# Patient Record
(demographics unavailable — no encounter records)

---

## 2024-10-10 NOTE — HISTORY OF PRESENT ILLNESS
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 PNDS poor sleep Rehem TMJ -Dr Nicole Pred-at present off f/u pos  cough  Noted in boot L Lower  ext  foot fx  ENT CT sinus possible surgical intervention  Recurrent repeat COVID-19 infection confirmed on 11/24/2022 Patient completed a course of oral Paxlovid Patient treated early December with Augmentin for a recurrent sinus infection F/U sleep  study Sleep Study Nov 17 /19 2022 AHI 4 *8 + hours of study does not meet criteria for OS  prefers Tudorza but issue with coverage Treatment first week of December for sinus congestion headache with Flonase antibiotic resolution of symptoms- mild chronic  Noted with Rheum elevated WBC Post hospitalization discharge from [facility] March  reports July 2020 COVID AB negative feels better with LAMA Turdoza Patient was having a week of some chest discomfort with shortness of breath and chest tightness No purulent sputum or hemoptysis reported Had some associated sinus symptoms with congestion and nasal drip No fever reported She was subsequently admitted put in isolation for possible presumed TB based on her chest x-ray and CT chest Findings were consistent with a prior known left upper lobe mass consistent with a well-known longstanding aspergilloma's, 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 3-day taper In addition she has no body aches or pains weight is stable appetite is normal no other known constitutional symptoms She has no loss of taste or smell

## 2024-10-10 NOTE — PROCEDURE
[FreeTextEntry1] : PFT 10/10/24 Mild  OAD  Lung Volumes WNL DLCO 84 % HGB 11.6  GITA 7/25/24 Mild OAD  improvement at FVC  POCT 7/12/2024 Rapid COVID-positive  Chest x-ray PA lateral April 25, 2024 Lung fields are consistent with the known 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  lung volumes nl range  DLCO 71 % WNL  HGB 13.2 stable pulmonary physiology  GITA 6/26/23  Very Mild OAD  No BD at FEV1  no decline at flows  PFT 3/20/23 Mild OAD Lung Volumes nl  No   airtrapping  DLCo 88 % HGB 11.1  Chest x-ray PA lateral January 30, 2023 Normal cardiac size Chronic left upper lobe density consistent with known fungal mycetoma No parenchymal infiltrates pleural effusions or dominant pulmonary nodules Lamination right hemidiaphragm impression chronic noted interval change of the left upper lobe mycetoma secondary to sarcoidosis  PFT 1/30/23 Flow rates nl Mild OAD Lung Volumes nl' DLCO 73 % low nlk range HGB 11.5 Mild OAD with mild decline at DLCO and TLC  Sleep Study Nov 17 /19 2022 AHI 4 *8 + hours of study does not meet criteria for JAVIER  PFT 11/17/22  Mild OAD Lung Volumes nl  DLCO 89 % pred WNL HGB 11.5 stable pulmonary physiology  Spirometry no bronchodilator September 29, 2022 Flow rates normal range Stable FEV1 Sawtooth pattern clinical correlation rule out obstructive sleep apnea  PFT  6/23/22  Mild OAD  Lung Volumes nl range ERV dec sec pt weight DLCO 82 % WNL HGB 12.3 stable pulmonary physiology  PFT 3/10/22 mild OAD lung volumes DLCO 80 n% HGB 11.0  X-ray PA lateral March 10, 2022 Cardiac size normal Lobulated density left upper lung zone Right lungs are clear Impression longstanding mycetoma from underlying sarcoidosis no interval change compared to prior x-rays continue to monitor  PFT 12/2/21 Flow rates nl  Mild OAD Lung Volumes nl DLCO 85 %  HGB 11.3 very minimal decline at pulmonary physiology  PFT 9/2/21 Mild OAD  Lung Volumes nl  DLCO 86 % pred nl range  HGB 11.3 stable to interval improvement pulmonary physiology  PFT 6/3/21 Nl flow  rates  mild OASD  Lung Volumes nl  DLCO 87 %  HGB 10.5  Chest x-ray PA lateral on January 4, 2021 indication leukocytosis Chronic mycetoma left upper lung zone No change Otherwise lungs are clear without parenchymal infiltrates pleural effusions or dominant pulmonary nodules Lamination right hemidiaphragm Impression pulmonary lesion consistent with known mycetoma left upper lung zone in a patient with sarcoidosis  PFT October 8, 2020 protocol Mild obstructive ventilatory impairment No significant response to bronchodilator at the FEV1 Lung volumes are normal Resistance and specific conductance are normal Diffusion normal 80% of predicted. Stable pulmonary physiology Consistent with known diagnosis of asthma  11/26/2019 Pulmonary function testing FEV1, FVC, and FEV1/FVC are within normal limits.   Flu vaccination IM 10/10/24 PCV 23 IM October 5, 2020

## 2024-10-10 NOTE — DISCUSSION/SUMMARY
[FreeTextEntry1] :  positive active COVID-19 infection-treatment Paxlovid July 12 2024  Posttreatment sinusitis with Augmentin with chronic sxs and cough-Not  active Post COVID-19 infection 11/24/2022 Moderate persistent asthma Sarcoidosis Left upper lobe chronic mycetoma/pulmonary  etiology sarcoidosis Rheumatoid arthritis history of arthritis on longstanding methotrexate Intermittent chronic vertigo Obstructive pattern spirometry high clinical suspicion rule out obstructive sleep apnea-formal sleep study ordered home Negative Medications reviewed Vaccination profile up to date BREO Ellipta  Tudorza 1 puff BID SUPPLIED sample TRELEGY  ENT recommendation Dr Little or  partners- nasal sprays for PND f/u PFT Q 3 months  Received MODERNA  completed and recommended booster 3 moths from prior COVID Nov 2022 infection valium flight phobia I stop check COVID variant booster  completed issue Biologic agent-investigate authorization for NUCALA Indications moderate to severe persistent asthma symptomatology recurrent use of antibiotics systemic corticosteroids long-term controller therapy wheezing elevated eosinophils Rx updated

## 2024-10-10 NOTE — PHYSICAL EXAM
[Normal Appearance] : normal appearance [Supple] : supple [No Neck Mass] : no neck mass [No JVD] : no jvd [Normal Color/ Pigmentation] : normal color/ pigmentation [No Focal Deficits] : no focal deficits [No Sensory Deficits] : no sensory deficits [General Appearance - Well Developed] : well developed [General Appearance - Well Nourished] : well nourished [Heart Rate And Rhythm] : heart rate and rhythm were normal [Heart Sounds] : normal S1 and S2 [Respiration, Rhythm And Depth] : normal respiratory rhythm and effort [Auscultation Breath Sounds / Voice Sounds] : lungs were clear to auscultation bilaterally [Abdomen Soft] : soft [Abdomen Tenderness] : non-tender [] : no hepato-splenomegaly [Nail Clubbing] : no clubbing of the fingernails [Cyanosis, Localized] : no localized cyanosis

## 2024-12-06 NOTE — PLAN
[FreeTextEntry1] : 63 year old female presents for routine gyn exam - PMH atrophy, osteopenia, LCIS, MVP, asthma, sarcoidosis, reflux, gastritis. SHx: hysteroscopic polyp removal. BSE taught Breast and pelvic exam performed Pap/HPV conducted 10/24 mammogram and breast sonogram. Rx given, due in 10/25. 2021 colonoscopy, due in 2026  Atrophy: Apply Balmex/Aquaphor BID Advised Revaree Plus 2-3x/wk D/w pt alternative methods and hormonal txs, such as topical estrogen products. All R/B/A reviewed. Pt to trial Revaree and OTC products at this time.  Osteopenia: 10/23 DEXA bone density. Due 10/25. D/w pt Calcium 500 mg and Vitamin D 1000U intake, as well as weight-bearing exercise, such as walking, for 30 min daily  H/o EM polyp: Advised to schedule pelvic sono 12/24  RTO in 1 year or PRN

## 2024-12-06 NOTE — HISTORY OF PRESENT ILLNESS
[FreeTextEntry1] : 2024. MATTHEW REYNA 63 year old female  LMP PM presents for annual visit. PMH atrophy, osteopenia, LCIS, MVP, asthma, sarcoidosis, reflux, gastritis. SHx: hysteroscopic polyp removal.   Pt feels well and offers no complaints. She denies vaginal bleeding, abnormal vaginal discharge or vaginitis sxs. No urinary complaints. BM is normal per patient, no bloody stool. She denies abdominal and pelvic pain.   She had endoscopy this summer due to GI issues, likely attributable to medications she takes for rheumatoid arthritis. Endoscopy was unremarkable per pt. She will f/u with rheum to discuss alternative medications.  PMH: atrophy, osteopenia, LCIS, MVP, asthma, sarcoidosis, HLD, HTN, rheumatoid arthritis, reflux, gastritis GYNHx: h/o polyp SHx: sinus sx, hysteroscopic polyp removal FHx: Denies FHx of breast, ovarian, uterine or colon cancer. Med: methotrexate, edarbyclor, rosuvastatin Has 27 y/o daughter works for [employer] in [place] PHQ9=2 [Mammogramdate] : 10/24 [TextBox_19] : BIRADS 2 [BreastSonogramDate] : 10/24 [TextBox_25] : BIRADS 2 [PapSmeardate] : 11/23 [TextBox_31] : NILM HPV negative [BoneDensityDate] : 11/23 [TextBox_37] : osteopenia, normal frax [ColonoscopyDate] : 2021 [TextBox_43] : due 2026

## 2024-12-06 NOTE — PHYSICAL EXAM
[Chaperone Present] : A chaperone was present in the examining room during all aspects of the physical examination [10054] : A chaperone was present during the pelvic exam. [Appropriately responsive] : appropriately responsive [Alert] : alert [No Acute Distress] : no acute distress [No Lymphadenopathy] : no lymphadenopathy [Regular Rate Rhythm] : regular rate rhythm [No Murmurs] : no murmurs [Clear to Auscultation B/L] : clear to auscultation bilaterally [Soft] : soft [Non-tender] : non-tender [Non-distended] : non-distended [No HSM] : No HSM [No Lesions] : no lesions [No Mass] : no mass [Oriented x3] : oriented x3 [Examination Of The Breasts] : a normal appearance [No Masses] : no breast masses were palpable [Vulvar Atrophy] : vulvar atrophy [Labia Majora] : normal [Labia Minora] : normal [Atrophy] : atrophy [Normal] : normal [Uterine Adnexae] : normal [FreeTextEntry2] : [facility]) [FreeTextEntry9] : no masses, guaiac negative

## 2025-01-10 NOTE — DISCUSSION/SUMMARY
[FreeTextEntry1] :  positive active COVID-19 infection-treatment Paxlovid July 12 2024  Posttreatment sinusitis with Augmentin with chronic sxs and cough-Not  active Post COVID-19 infection 11/24/2022 Moderate persistent asthma cont TRELEY Sarcoidosis Left upper lobe chronic mycetoma/pulmonary  etiology sarcoidosis Rheumatoid arthritis history of arthritis on longstanding methotrexate Intermittent chronic vertigo Obstructive pattern spirometry high clinical suspicion rule out obstructive sleep apnea-formal sleep study ordered home Negative Medications reviewed Vaccination profile up to date BREO Ellipta  Tudorza 1 puff BID SUPPLIED sample TRELEGY  ENT recommendation Dr Little or  partners- nasal sprays for PND f/u PFT Q 3 months  Received MODERNA  completed and recommended booster 3 moths from prior COVID Nov 2022 infection valium flight phobia I stop check COVID variant booster  completed issue Biologic agent-investigate authorization for NUCALA Indications moderate to severe persistent asthma symptomatology recurrent use of antibiotics systemic corticosteroids long-term controller therapy wheezing elevated eosinophils Rx updated

## 2025-01-10 NOTE — REASON FOR VISIT
[Follow-Up] : a follow-up visit [Asthma] : asthma [Sarcoidosis] : sarcoidosis [TextBox_44] : Mycetoma

## 2025-01-10 NOTE — HISTORY OF PRESENT ILLNESS
[Never] : never [Snoring] : snoring [TextBox_4] : 1/10/25 pt here for f/u COVID back in July, feeling well, seasonal allergies as of right now  with frigid weather little chest  congestion  no sputum  no Heme  complaint with controller tx  Chief complaint sinus pain sinus fullness headache Resolved Cough Drip Does not feel chest congestion wheeze Did not report wheeze No COVID rapid antigen test completed  Asthma on long-term chronic controller therapy with intermittent steroid requirement  TRELEGY stable without inc Resp sxs sarcoidosis  Fungal mycetoma KRYSTLE c/o sinus disease pos PNDS poor sleep Rehem TMJ -Dr Nicole Pred-at present off f/u pos  cough  Noted in boot L Lower  ext  foot fx  ENT CT sinus possible surgical intervention  Recurrent repeat COVID-19 infection confirmed on 11/24/2022 Patient completed a course of oral Paxlovid Patient treated early December with Augmentin for a recurrent sinus infection F/U sleep  study Sleep Study Nov 17 /19 2022 AHI 4 *8 + hours of study does not meet criteria for OS  prefers Tudorza but issue with coverage Treatment first week of December for sinus congestion headache with Flonase antibiotic resolution of symptoms- mild chronic  Noted with Rheum elevated WBC Post hospitalization discharge from [facility] March  reports July 2020 COVID AB negative feels better with LAMA Turdoza Patient was having a week of some chest discomfort with shortness of breath and chest tightness No purulent sputum or hemoptysis reported Had some associated sinus symptoms with congestion and nasal drip No fever reported She was subsequently admitted put in isolation for possible presumed TB based on her chest x-ray and CT chest Findings were consistent with a prior known left upper lobe mass consistent with a well-known longstanding aspergilloma's, 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 3-day taper In addition she has no body aches or pains weight is stable appetite is normal no other known constitutional symptoms She has no loss of taste or smell

## 2025-01-10 NOTE — PROCEDURE
[FreeTextEntry1] :  GITA 1/10/25  Moderate OAD Pos BD 21 %  PFT 10/10/24 Mild  OAD  Lung Volumes WNL DLCO 84 % HGB 11.6  GITA 7/25/24 Mild OAD  improvement at FVC  POCT 7/12/2024 Rapid COVID-positive  Chest x-ray PA lateral April 25, 2024 Lung fields are consistent with the known 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  lung volumes nl range  DLCO 71 % WNL  HGB 13.2 stable pulmonary physiology  GITA 6/26/23  Very Mild OAD  No BD at FEV1  no decline at flows  PFT 3/20/23 Mild OAD Lung Volumes nl  No   airtrapping  DLCo 88 % HGB 11.1  Chest x-ray PA lateral January 30, 2023 Normal cardiac size Chronic left upper lobe density consistent with known fungal mycetoma No parenchymal infiltrates pleural effusions or dominant pulmonary nodules Lamination right hemidiaphragm impression chronic noted interval change of the left upper lobe mycetoma secondary to sarcoidosis  PFT 1/30/23 Flow rates nl Mild OAD Lung Volumes nl' DLCO 73 % low nlk range HGB 11.5 Mild OAD with mild decline at DLCO and TLC  Sleep Study Nov 17 /19 2022 AHI 4 *8 + hours of study does not meet criteria for JAVIER  PFT 11/17/22  Mild OAD Lung Volumes nl  DLCO 89 % pred WNL HGB 11.5 stable pulmonary physiology  Spirometry no bronchodilator September 29, 2022 Flow rates normal range Stable FEV1 Sawtooth pattern clinical correlation rule out obstructive sleep apnea  PFT  6/23/22  Mild OAD  Lung Volumes nl range ERV dec sec pt weight DLCO 82 % WNL HGB 12.3 stable pulmonary physiology  PFT 3/10/22 mild OAD lung volumes DLCO 80 n% HGB 11.0  X-ray PA lateral March 10, 2022 Cardiac size normal Lobulated density left upper lung zone Right lungs are clear Impression longstanding mycetoma from underlying sarcoidosis no interval change compared to prior x-rays continue to monitor  PFT 12/2/21 Flow rates nl  Mild OAD Lung Volumes nl DLCO 85 %  HGB 11.3 very minimal decline at pulmonary physiology  PFT 9/2/21 Mild OAD  Lung Volumes nl  DLCO 86 % pred nl range  HGB 11.3 stable to interval improvement pulmonary physiology  PFT 6/3/21 Nl flow  rates  mild OASD  Lung Volumes nl  DLCO 87 %  HGB 10.5  Chest x-ray PA lateral on January 4, 2021 indication leukocytosis Chronic mycetoma left upper lung zone No change Otherwise lungs are clear without parenchymal infiltrates pleural effusions or dominant pulmonary nodules Lamination right hemidiaphragm Impression pulmonary lesion consistent with known mycetoma left upper lung zone in a patient with sarcoidosis  PFT October 8, 2020 protocol Mild obstructive ventilatory impairment No significant response to bronchodilator at the FEV1 Lung volumes are normal Resistance and specific conductance are normal Diffusion normal 80% of predicted. Stable pulmonary physiology Consistent with known diagnosis of asthma  11/26/2019 Pulmonary function testing FEV1, FVC, and FEV1/FVC are within normal limits.   Flu vaccination IM 10/10/24 PCV 23 IM October 5, 2020

## 2025-02-06 NOTE — DISCUSSION/SUMMARY
[FreeTextEntry1] : Chronic asthma chest congestion Rule out trigger of stress Treatment of whether Will complete steroids Follow-up 1 month Will notify if any deterioration Remains on controller therapy up to date  positive active COVID-19 infection-treatment Paxlovid July 12 2024  Posttreatment sinusitis with Augmentin with chronic sxs and cough-Not  active Post COVID-19 infection 11/24/2022 Moderate persistent asthma cont TRELEY Sarcoidosis Left upper lobe chronic mycetoma/pulmonary  etiology sarcoidosis Rheumatoid arthritis history of arthritis on longstanding methotrexate Intermittent chronic vertigo Obstructive pattern spirometry high clinical suspicion rule out obstructive sleep apnea-formal sleep study ordered home Negative Medications reviewed Vaccination profile up to date BREO Ellipta  Tudorza 1 puff BID SUPPLIED sample TRELEGY  ENT recommendation Dr Little or  partners- nasal sprays for PND f/u PFT Q 3 months  Received MODERNA  completed and recommended booster 3 moths from prior COVID Nov 2022 infection valium flight phobia I stop check COVID variant booster  completed issue Biologic agent-investigate authorization for NUCALA Indications moderate to severe persistent asthma symptomatology recurrent use of antibiotics systemic corticosteroids long-term controller therapy wheezing elevated eosinophils Rx updated

## 2025-02-06 NOTE — HISTORY OF PRESENT ILLNESS
[Never] : never [Snoring] : snoring [TextBox_4] : 2/6/25 was feeling tightness in chest - placed on zpak and about to finish up prednisone  having inconsistent coughing and chest tightness - post nasal drip  is stressed because mom is on hospice using trelegy daily and albuterol PRN  montelukast daily and allegra PRN  no sputum and no fevers   1/10/25 pt here for f/u COVID back in July, feeling well, seasonal allergies as of right now  with frigid weather little chest  congestion  no sputum  no Heme  complaint with controller tx  Chief complaint sinus pain sinus fullness headache Resolved Cough Drip Does not feel chest congestion wheeze Did not report wheeze No COVID rapid antigen test completed  Asthma on long-term chronic controller therapy with intermittent steroid requirement  TRELEGY stable without inc Resp sxs sarcoidosis  Fungal mycetoma KRYSTLE c/o sinus disease pos PNDS poor sleep Rehem TMJ -Dr Nicole Pred-at present off f/u pos  cough  Noted in boot L Lower  ext  foot fx  ENT CT sinus possible surgical intervention  Recurrent repeat COVID-19 infection confirmed on 11/24/2022 Patient completed a course of oral Paxlovid Patient treated early December with Augmentin for a recurrent sinus infection F/U sleep  study Sleep Study Nov 17 /19 2022 AHI 4 *8 + hours of study does not meet criteria for OS  prefers Tudorza but issue with coverage Treatment first week of December for sinus congestion headache with Flonase antibiotic resolution of symptoms- mild chronic  Noted with Rheum elevated WBC Post hospitalization discharge from [facility] March  reports July 2020 COVID AB negative feels better with LAMA Turdoza Patient was having a week of some chest discomfort with shortness of breath and chest tightness No purulent sputum or hemoptysis reported Had some associated sinus symptoms with congestion and nasal drip No fever reported She was subsequently admitted put in isolation for possible presumed TB based on her chest x-ray and CT chest Findings were consistent with a prior known left upper lobe mass consistent with a well-known longstanding aspergilloma's, 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 3-day taper In addition she has no body aches or pains weight is stable appetite is normal no other known constitutional symptoms She has no loss of taste or smell

## 2025-02-06 NOTE — PROCEDURE
[FreeTextEntry1] : NIOX 10 normal range [facility] patient February 6, 2025 Spirometry February 6, 2025 Florexa well-preserved Mild obstructive pattern No bronchodilator response in FEV1 or small airways Interval improvement in FEV1 from 1.52-1.92  Chest x-ray PA lateral February 6, 2025 Cardiac size is normal Left upper lung zone density consistent with known mycetoma in a patient with known sarcoidosis longstanding abnormal finding Right lung is otherwise clear No parenchymal pleural effusion dominant pulmonary nodules No evidence for infection No consolidation Lamination right hemidiaphragm No interval change compared to chest x-ray April 25, 2020  GITA 1/10/25  Moderate OAD Pos BD 21 %  PFT 10/10/24 Mild  OAD  Lung Volumes WNL DLCO 84 % HGB 11.6  GITA 7/25/24 Mild OAD  improvement at FVC  POCT 7/12/2024 Rapid COVID-positive  Chest x-ray PA lateral April 25, 2024 Lung fields are consistent with the known 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  lung volumes nl range  DLCO 71 % WNL  HGB 13.2 stable pulmonary physiology  GITA 6/26/23  Very Mild OAD  No BD at FEV1  no decline at flows  PFT 3/20/23 Mild OAD Lung Volumes nl  No   airtrapping  DLCo 88 % HGB 11.1  Chest x-ray PA lateral January 30, 2023 Normal cardiac size Chronic left upper lobe density consistent with known fungal mycetoma No parenchymal infiltrates pleural effusions or dominant pulmonary nodules Lamination right hemidiaphragm impression chronic noted interval change of the left upper lobe mycetoma secondary to sarcoidosis  PFT 1/30/23 Flow rates nl Mild OAD Lung Volumes nl' DLCO 73 % low nlk range HGB 11.5 Mild OAD with mild decline at DLCO and TLC  Sleep Study Nov 17 /19 2022 AHI 4 *8 + hours of study does not meet criteria for JAVIER  PFT 11/17/22  Mild OAD Lung Volumes nl  DLCO 89 % pred WNL HGB 11.5 stable pulmonary physiology  Spirometry no bronchodilator September 29, 2022 Flow rates normal range Stable FEV1 Sawtooth pattern clinical correlation rule out obstructive sleep apnea  PFT  6/23/22  Mild OAD  Lung Volumes nl range ERV dec sec pt weight DLCO 82 % WNL HGB 12.3 stable pulmonary physiology  PFT 3/10/22 mild OAD lung volumes DLCO 80 n% HGB 11.0  X-ray PA lateral March 10, 2022 Cardiac size normal Lobulated density left upper lung zone Right lungs are clear Impression longstanding mycetoma from underlying sarcoidosis no interval change compared to prior x-rays continue to monitor  PFT 12/2/21 Flow rates nl  Mild OAD Lung Volumes nl DLCO 85 %  HGB 11.3 very minimal decline at pulmonary physiology  PFT 9/2/21 Mild OAD  Lung Volumes nl  DLCO 86 % pred nl range  HGB 11.3 stable to interval improvement pulmonary physiology  PFT 6/3/21 Nl flow  rates  mild OASD  Lung Volumes nl  DLCO 87 %  HGB 10.5  Chest x-ray PA lateral on January 4, 2021 indication leukocytosis Chronic mycetoma left upper lung zone No change Otherwise lungs are clear without parenchymal infiltrates pleural effusions or dominant pulmonary nodules Lamination right hemidiaphragm Impression pulmonary lesion consistent with known mycetoma left upper lung zone in a patient with sarcoidosis  PFT October 8, 2020 protocol Mild obstructive ventilatory impairment No significant response to bronchodilator at the FEV1 Lung volumes are normal Resistance and specific conductance are normal Diffusion normal 80% of predicted. Stable pulmonary physiology Consistent with known diagnosis of asthma  11/26/2019 Pulmonary function testing FEV1, FVC, and FEV1/FVC are within normal limits.   Flu vaccination IM 10/10/24 PCV 23 IM October 5, 2020

## 2025-03-06 NOTE — PROCEDURE
[FreeTextEntry1] : GITA 3/6/25  mild OAD  no BD at FEV1   NIOX 10 normal range [facility] patient February 6, 2025 Spirometry February 6, 2025 flow  rates well-preserved Mild obstructive pattern No bronchodilator response in FEV1 or small airways Interval improvement in FEV1 from 1.52-1.92  Chest x-ray PA lateral February 6, 2025 Cardiac size is normal Left upper lung zone density consistent with known mycetoma in a patient with known sarcoidosis longstanding abnormal finding Right lung is otherwise clear No parenchymal pleural effusion dominant pulmonary nodules No evidence for infection No consolidation Lamination right hemidiaphragm No interval change compared to chest x-ray April 25, 2020  GITA 1/10/25  Moderate OAD Pos BD 21 %  PFT 10/10/24 Mild  OAD  Lung Volumes WNL DLCO 84 % HGB 11.6  GITA 7/25/24 Mild OAD  improvement at FVC  POCT 7/12/2024 Rapid COVID-positive  Chest x-ray PA lateral April 25, 2024 Lung fields are consistent with the known 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  lung volumes nl range  DLCO 71 % WNL  HGB 13.2 stable pulmonary physiology  GITA 6/26/23  Very Mild OAD  No BD at FEV1  no decline at flows  PFT 3/20/23 Mild OAD Lung Volumes nl  No   airtrapping  DLCo 88 % HGB 11.1  Chest x-ray PA lateral January 30, 2023 Normal cardiac size Chronic left upper lobe density consistent with known fungal mycetoma No parenchymal infiltrates pleural effusions or dominant pulmonary nodules Lamination right hemidiaphragm impression chronic noted interval change of the left upper lobe mycetoma secondary to sarcoidosis  PFT 1/30/23 Flow rates nl Mild OAD Lung Volumes nl' DLCO 73 % low nlk range HGB 11.5 Mild OAD with mild decline at DLCO and TLC  Sleep Study Nov 17 /19 2022 AHI 4 *8 + hours of study does not meet criteria for JAVIER  PFT 11/17/22  Mild OAD Lung Volumes nl  DLCO 89 % pred WNL HGB 11.5 stable pulmonary physiology  Spirometry no bronchodilator September 29, 2022 Flow rates normal range Stable FEV1 Sawtooth pattern clinical correlation rule out obstructive sleep apnea  PFT  6/23/22  Mild OAD  Lung Volumes nl range ERV dec sec pt weight DLCO 82 % WNL HGB 12.3 stable pulmonary physiology  PFT 3/10/22 mild OAD lung volumes DLCO 80 n% HGB 11.0  X-ray PA lateral March 10, 2022 Cardiac size normal Lobulated density left upper lung zone Right lungs are clear Impression longstanding mycetoma from underlying sarcoidosis no interval change compared to prior x-rays continue to monitor  PFT 12/2/21 Flow rates nl  Mild OAD Lung Volumes nl DLCO 85 %  HGB 11.3 very minimal decline at pulmonary physiology  PFT 9/2/21 Mild OAD  Lung Volumes nl  DLCO 86 % pred nl range  HGB 11.3 stable to interval improvement pulmonary physiology  PFT 6/3/21 Nl flow  rates  mild OASD  Lung Volumes nl  DLCO 87 %  HGB 10.5  Chest x-ray PA lateral on January 4, 2021 indication leukocytosis Chronic mycetoma left upper lung zone No change Otherwise lungs are clear without parenchymal infiltrates pleural effusions or dominant pulmonary nodules Lamination right hemidiaphragm Impression pulmonary lesion consistent with known mycetoma left upper lung zone in a patient with sarcoidosis  PFT October 8, 2020 protocol Mild obstructive ventilatory impairment No significant response to bronchodilator at the FEV1 Lung volumes are normal Resistance and specific conductance are normal Diffusion normal 80% of predicted. Stable pulmonary physiology Consistent with known diagnosis of asthma  11/26/2019 Pulmonary function testing FEV1, FVC, and FEV1/FVC are within normal limits.   Flu vaccination IM 10/10/24 PCV 23 IM October 5, 2020

## 2025-03-06 NOTE — HISTORY OF PRESENT ILLNESS
[Never] : never [Snoring] : snoring [TextBox_4] : occ chest tight hx as noted  chronic  asthma sarcoid  mycetoma  pos PND with coughwas feeling tightness in chest - placed on zpak and about to finish up prednisone  completed having inconsistent coughing and chest tightness - post nasal drip  is stressed because mom is on hospice using trelegy daily and albuterol PRN  montelukast daily and allegra PRN  no sputum and no fevers   1/10/25 pt here for f/u COVID back in July, feeling well, seasonal allergies as of right now  with frigid weather little chest  congestion  no sputum  no Heme  complaint with controller tx  Chief complaint sinus pain sinus fullness headache Resolved Cough Drip Does not feel chest congestion wheeze Did not report wheeze No COVID rapid antigen test completed  Asthma on long-term chronic controller therapy with intermittent steroid requirement  TRELEGY stable without inc Resp sxs sarcoidosis  Fungal mycetoma KRYSTLE c/o sinus disease pos PNDS poor sleep Rehem TMJ -Dr Nicole Pred-at present off f/u pos  cough  Noted in boot [facility] [place]  ext  foot fx  ENT CT sinus possible surgical intervention  Recurrent repeat COVID-19 infection confirmed on 11/24/2022 Patient completed a course of oral Paxlovid Patient treated early December with Augmentin for a recurrent sinus infection F/U sleep  study Sleep Study Nov 17 /19 2022 AHI 4 *8 + hours of study does not meet criteria for OS  prefers Tudorza but issue with coverage Treatment first week of December for sinus congestion headache with Flonase antibiotic resolution of symptoms- mild chronic  Noted with Rheum elevated WBC Post hospitalization discharge from [facility] March  reports July 2020 COVID AB negative feels better with LAMA Turdoza Patient was having a week of some chest discomfort with shortness of breath and chest tightness No purulent sputum or hemoptysis reported Had some associated sinus symptoms with congestion and nasal drip No fever reported She was subsequently admitted put in isolation for possible presumed TB based on her chest x-ray and CT chest Findings were consistent with a prior known left upper lobe mass consistent with a well-known longstanding aspergilloma's, 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 3-day taper In addition she has no body aches or pains weight is stable appetite is normal no other known constitutional symptoms She has no loss of taste or smell

## 2025-03-06 NOTE — DISCUSSION/SUMMARY
[FreeTextEntry1] : Chronic asthma chest congestion stable flow  rates Rule out trigger of stress Treatment of whether Will complete steroids done OFF Follow-up   6  weeks Will notify if any deterioration Remains on controller therapy up to date  positive active COVID-19 infection-treatment Paxlovid July 12 2024  Posttreatment sinusitis with Augmentin with chronic sxs and cough-Not  active Post COVID-19 infection 11/24/2022 Moderate persistent asthma cont TRELEY Sarcoidosis Left upper lobe chronic mycetoma/pulmonary  etiology sarcoidosis Rheumatoid arthritis history of arthritis on longstanding methotrexate Intermittent chronic vertigo Obstructive pattern spirometry high clinical suspicion rule out obstructive sleep apnea-formal sleep study ordered home Negative Medications reviewed Vaccination profile up to date BREO Ellipta  Tudorza 1 puff BID SUPPLIED sample TRELEGY  ENT recommendation Dr Little or  partners- nasal sprays for PND f/u PFT Q 3 months  Received MODERNA  completed and recommended booster 3 moths from prior COVID Nov 2022 infection valium flight phobia I stop check COVID variant booster  completed issue Biologic agent-investigate authorization for NUCALA Indications moderate to severe persistent asthma symptomatology recurrent use of antibiotics systemic corticosteroids long-term controller therapy wheezing elevated eosinophils Rx updated

## 2025-03-06 NOTE — PHYSICAL EXAM
[Normal Appearance] : normal appearance [Supple] : supple [No Neck Mass] : no neck mass [No JVD] : no jvd [Normal Color/ Pigmentation] : normal color/ pigmentation [No Focal Deficits] : no focal deficits [No Sensory Deficits] : no sensory deficits [General Appearance - Well Developed] : well developed [General Appearance - Well Nourished] : well nourished [Heart Rate And Rhythm] : heart rate and rhythm were normal [Heart Sounds] : normal S1 and S2 [Respiration, Rhythm And Depth] : normal respiratory rhythm and effort [Auscultation Breath Sounds / Voice Sounds] : lungs were clear to auscultation bilaterally [Abdomen Soft] : soft [Abdomen Tenderness] : non-tender [] : no hepato-splenomegaly [Nail Clubbing] : no clubbing of the fingernails [Cyanosis, Localized] : no localized cyanosis [No Acute Distress] : no acute distress [Normal Oropharynx] : normal oropharynx [Normal Rate/Rhythm] : normal rate/rhythm [Normal S1, S2] : normal s1, s2 [No Murmurs] : no murmurs [No Resp Distress] : no resp distress [Clear to Auscultation Bilaterally] : clear to auscultation bilaterally [No Abnormalities] : no abnormalities [Benign] : benign [Normal Gait] : normal gait [No Clubbing] : no clubbing [No Cyanosis] : no cyanosis [No Edema] : no edema [FROM] : FROM [Oriented x3] : oriented x3 [Normal Affect] : normal affect [TextBox_11] : pos thrush 3/6/25

## 2025-04-11 NOTE — PROCEDURE
[FreeTextEntry1] : PFT April 11, 2025 indication asthma, pulmonary sarcoidosis Moderate obstructive ventilatory impairment Significant bronchodilator sponsor 27% at FEV1 Lung volumes normal No restrictive component Total lung Pasini 85% of predicted No air trapping Diffusion normal range 80% of predicted NIOX 18 normal range April 11, 2025  GITA 3/6/25  mild OAD  no BD at FEV1   NIOX 10 normal range GHI patient February 6, 2025 Spirometry February 6, 2025 flow  rates well-preserved Mild obstructive pattern No bronchodilator response in FEV1 or small airways Interval improvement in FEV1 from 1.52-1.92  Chest x-ray PA lateral February 6, 2025 Cardiac size is normal Left upper lung zone density consistent with known mycetoma in a patient with known sarcoidosis longstanding abnormal finding Right lung is otherwise clear No parenchymal pleural effusion dominant pulmonary nodules No evidence for infection No consolidation Lamination right hemidiaphragm No interval change compared to chest x-ray April 25, 2020  GITA 1/10/25  Moderate OAD Pos BD 21 %  PFT 10/10/24 Mild  OAD  Lung Volumes WNL DLCO 84 % HGB 11.6  GITA 7/25/24 Mild OAD  improvement at FVC  POCT 7/12/2024 Rapid COVID-positive  Chest x-ray PA lateral April 25, 2024 Lung fields are consistent with the known 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  lung volumes nl range  DLCO 71 % WNL  HGB 13.2 stable pulmonary physiology  GITA 6/26/23  Very Mild OAD  No BD at FEV1  no decline at flows  PFT 3/20/23 Mild OAD Lung Volumes nl  No   airtrapping  DLCo 88 % HGB 11.1  Chest x-ray PA lateral January 30, 2023 Normal cardiac size Chronic left upper lobe density consistent with known fungal mycetoma No parenchymal infiltrates pleural effusions or dominant pulmonary nodules Lamination right hemidiaphragm impression chronic noted interval change of the left upper lobe mycetoma secondary to sarcoidosis  PFT 1/30/23 Flow rates nl Mild OAD Lung Volumes nl' DLCO 73 % low nlk range HGB 11.5 Mild OAD with mild decline at DLCO and TLC  Sleep Study Nov 17 /19 2022 AHI 4 *8 + hours of study does not meet criteria for JAVIER  PFT 11/17/22  Mild OAD Lung Volumes nl  DLCO 89 % pred WNL HGB 11.5 stable pulmonary physiology  Spirometry no bronchodilator September 29, 2022 Flow rates normal range Stable FEV1 Sawtooth pattern clinical correlation rule out obstructive sleep apnea  PFT  6/23/22  Mild OAD  Lung Volumes nl range ERV dec sec pt weight DLCO 82 % WNL HGB 12.3 stable pulmonary physiology  PFT 3/10/22 mild OAD lung volumes DLCO 80 n% HGB 11.0  X-ray PA lateral March 10, 2022 Cardiac size normal Lobulated density left upper lung zone Right lungs are clear Impression longstanding mycetoma from underlying sarcoidosis no interval change compared to prior x-rays continue to monitor  PFT 12/2/21 Flow rates nl  Mild OAD Lung Volumes nl DLCO 85 %  HGB 11.3 very minimal decline at pulmonary physiology  PFT 9/2/21 Mild OAD  Lung Volumes nl  DLCO 86 % pred nl range  HGB 11.3 stable to interval improvement pulmonary physiology  PFT 6/3/21 Nl flow  rates  mild OASD  Lung Volumes nl  DLCO 87 %  HGB 10.5  Chest x-ray PA lateral on January 4, 2021 indication leukocytosis Chronic mycetoma left upper lung zone No change Otherwise lungs are clear without parenchymal infiltrates pleural effusions or dominant pulmonary nodules Lamination right hemidiaphragm Impression pulmonary lesion consistent with known mycetoma left upper lung zone in a patient with sarcoidosis  PFT October 8, 2020 protocol Mild obstructive ventilatory impairment No significant response to bronchodilator at the FEV1 Lung volumes are normal Resistance and specific conductance are normal Diffusion normal 80% of predicted. Stable pulmonary physiology Consistent with known diagnosis of asthma  11/26/2019 Pulmonary function testing FEV1, FVC, and FEV1/FVC are within normal limits.   Flu vaccination IM 10/10/24 PCV 23 IM October 5, 2020

## 2025-04-11 NOTE — HISTORY OF PRESENT ILLNESS
[Never] : never [Snoring] : snoring [TextBox_4] : occ chest tight but no decline hx as noted  chronic  asthma sarcoid  mycetoma to complete Diflucan for prior  noted thrush  pos PND with coughwas feeling tightness in chest - placed on zpak and about to finish up prednisone  completed having inconsistent coughing and chest tightness - post nasal drip  is stressed because mom is on hospice using trelegy daily and albuterol PRN  montelukast daily and allegra PRN  no sputum and no fevers   1/10/25 pt here for f/u COVID back in July, feeling well, seasonal allergies as of right now  with frigid weather little chest  congestion  no sputum  no Heme  complaint with controller tx  Chief complaint sinus pain sinus fullness headache Resolved Cough Drip Does not feel chest congestion wheeze Did not report wheeze No COVID rapid antigen test completed  Asthma on long-term chronic controller therapy with intermittent steroid requirement  TRELEGY stable without inc Resp sxs sarcoidosis  Fungal mycetoma KRYSTLE c/o sinus disease pos PNDS poor sleep Rehem TMJ -Dr Nicole Pred-at present off f/u pos  cough  Noted in boot L Lower  ext  foot fx  ENT CT sinus possible surgical intervention  Recurrent repeat COVID-19 infection confirmed on 11/24/2022 Patient completed a course of oral Paxlovid Patient treated early December with Augmentin for a recurrent sinus infection F/U sleep  study Sleep Study Nov 17 /19 2022 AHI 4 *8 + hours of study does not meet criteria for OS  prefers Tudorza but issue with coverage Treatment first week of December for sinus congestion headache with Flonase antibiotic resolution of symptoms- mild chronic  Noted with Rheum elevated WBC Post hospitalization discharge from [facility] March reports July 2020 COVID AB negative feels better with LAMA Turdoza Patient was having a week of some chest discomfort with shortness of breath and chest tightness No purulent sputum or hemoptysis reported Had some associated sinus symptoms with congestion and nasal drip No fever reported She was subsequently admitted put in isolation for possible presumed TB based on her chest x-ray and CT chest Findings were consistent with a prior known left upper lobe mass consistent with a well-known longstanding aspergilloma's, 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 3-day taper In addition she has no body aches or pains weight is stable appetite is normal no other known constitutional symptoms She has no loss of taste or smell

## 2025-04-11 NOTE — DISCUSSION/SUMMARY
[FreeTextEntry1] : Chronic asthma chest congestion stable flow  rates Reactive component maintain controller therapy notify if any decline in overall respiratory status Rule out trigger of stress Treatment of whether Will complete steroids done OFF Follow-up   6  weeks Will notify if any deterioration Remains on controller therapy up to date  positive active COVID-19 infection-treatment Paxlovid July 12 2024  Posttreatment sinusitis with Augmentin with chronic sxs and cough-Not  active Post COVID-19 infection 11/24/2022 Moderate persistent asthma cont TRELEY Sarcoidosis Left upper lobe chronic mycetoma/pulmonary  etiology sarcoidosis Rheumatoid arthritis history of arthritis on longstanding methotrexate Intermittent chronic vertigo Obstructive pattern spirometry high clinical suspicion rule out obstructive sleep apnea-formal sleep study ordered home Negative Medications reviewed Vaccination profile up to date BREO Ellipta  Tudorza 1 puff BID SUPPLIED sample TRELEGY  ENT recommendation Dr Little or  partners- nasal sprays for PND f/u PFT Q 3 months  Received MODERNA  completed and recommended booster 3 moths from prior COVID Nov 2022 infection valium flight phobia I stop check COVID variant booster  completed issue Biologic agent-investigate authorization for NUCALA Indications moderate to severe persistent asthma symptomatology recurrent use of antibiotics systemic corticosteroids long-term controller therapy wheezing elevated eosinophils Rx updated

## 2025-04-11 NOTE — PHYSICAL EXAM
[No Acute Distress] : no acute distress [Normal Oropharynx] : normal oropharynx [Normal Appearance] : normal appearance [Supple] : supple [No Neck Mass] : no neck mass [No JVD] : no jvd [Normal Rate/Rhythm] : normal rate/rhythm [Normal S1, S2] : normal s1, s2 [No Murmurs] : no murmurs [No Resp Distress] : no resp distress [Clear to Auscultation Bilaterally] : clear to auscultation bilaterally [No Abnormalities] : no abnormalities [Benign] : benign [Normal Gait] : normal gait [No Clubbing] : no clubbing [No Cyanosis] : no cyanosis [No Edema] : no edema [FROM] : FROM [Normal Color/ Pigmentation] : normal color/ pigmentation [No Focal Deficits] : no focal deficits [No Sensory Deficits] : no sensory deficits [Oriented x3] : oriented x3 [Normal Affect] : normal affect [General Appearance - Well Developed] : well developed [General Appearance - Well Nourished] : well nourished [Heart Rate And Rhythm] : heart rate and rhythm were normal [Heart Sounds] : normal S1 and S2 [Respiration, Rhythm And Depth] : normal respiratory rhythm and effort [Auscultation Breath Sounds / Voice Sounds] : lungs were clear to auscultation bilaterally [Abdomen Soft] : soft [Abdomen Tenderness] : non-tender [] : no hepato-splenomegaly [Nail Clubbing] : no clubbing of the fingernails [Cyanosis, Localized] : no localized cyanosis [TextBox_11] : pos thrush 3/6/25

## 2025-05-09 NOTE — HISTORY OF PRESENT ILLNESS
[Never] : never [Snoring] : snoring [TextBox_4] : PREOP Pulmonary Medical Clearance hx as noted  chronic  asthma sarcoid  mycetoma to complete Diflucan for prior  noted thrush  pos PND with coughwas feeling tightness in chest - placed on zpak and about to finish up prednisone  completed having inconsistent coughing and chest tightness - post nasal drip  is stressed because mom is on hospice using trelegy daily and albuterol PRN  montelukast daily and allegra PRN  no sputum and no fevers   1/10/25 pt here for f/u COVID back in July, feeling well, seasonal allergies as of right now  with frigid weather little chest  congestion  no sputum  no Heme  complaint with controller tx  Chief complaint sinus pain sinus fullness headache Resolved Cough Drip Does not feel chest congestion wheeze Did not report wheeze No COVID rapid antigen test completed  Asthma on long-term chronic controller therapy with intermittent steroid requirement  TRELEGY stable without inc Resp sxs sarcoidosis  Fungal mycetoma KRYSTLE c/o sinus disease pos PNDS poor sleep Rehem TMJ -Dr Nicole Pred-at present off f/u pos  cough  Noted in boot L [place]  ext  foot fx  ENT CT sinus possible surgical intervention  Recurrent repeat COVID-19 infection confirmed on 11/24/2022 Patient completed a course of oral Paxlovid Patient treated early December with Augmentin for a recurrent sinus infection F/U sleep  study Sleep Study Nov 17 /19 2022 AHI 4 *8 + hours of study does not meet criteria for OS  prefers Tudorza but issue with coverage Treatment first week of December for sinus congestion headache with Flonase antibiotic resolution of symptoms- mild chronic  Noted with Rheum elevated WBC Post hospitalization discharge from [facility] March reports July 2020 COVID AB negative feels better with LAMA Turdoza Patient was having a week of some chest discomfort with shortness of breath and chest tightness No purulent sputum or hemoptysis reported Had some associated sinus symptoms with congestion and nasal drip No fever reported She was subsequently admitted put in isolation for possible presumed TB based on her chest x-ray and CT chest Findings were consistent with a prior known left upper lobe mass consistent with a well-known longstanding aspergilloma's, 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 3-day taper In addition she has no body aches or pains weight is stable appetite is normal no other known constitutional symptoms She has no loss of taste or smell

## 2025-05-09 NOTE — ASSESSMENT
[FreeTextEntry1] : Patient is scheduled for gastric colonoscopy  Pulmonary diagnosis Chronic asthma Pulmonary sarcoidosis Mycetoma History rheumatoid arthritis Based on evaluation there would not be any contraindications to undergoing scheduled GI colonoscopy Patient is medically and pulmonary cleared   Continue controller therapy

## 2025-05-09 NOTE — PROCEDURE
[FreeTextEntry1] : NOIOX 18  WNL  5/9/25 End tidal CO2 35 no hypercarbia  5/9/25  GITA 5/9/25  well preserved flow  rates  Mild OAD  borderline BD at FEV1 10 %  PFT April 11, 2025 indication asthma, pulmonary sarcoidosis Moderate obstructive ventilatory impairment Significant bronchodilator sponsor 27% at FEV1 Lung volumes normal No restrictive component Total lung capacity 85% of predicted No air trapping Diffusion normal range 80% of predicted NIOX 18 normal range April 11, 2025  GITA 3/6/25  mild OAD  no BD at FEV1   NIOX 10 normal range [facility] patient February 6, 2025 Spirometry February 6, 2025 flow  rates well-preserved Mild obstructive pattern No bronchodilator response in FEV1 or small airways Interval improvement in FEV1 from 1.52-1.92  Chest x-ray PA lateral February 6, 2025 Cardiac size is normal Left upper lung zone density consistent with known mycetoma in a patient with known sarcoidosis longstanding abnormal finding Right lung is otherwise clear No parenchymal pleural effusion dominant pulmonary nodules No evidence for infection No consolidation Lamination right hemidiaphragm No interval change compared to chest x-ray April 25, 2020  GITA 1/10/25  Moderate OAD Pos BD 21 %  PFT 10/10/24 Mild  OAD  Lung Volumes WNL DLCO 84 % HGB 11.6  GITA 7/25/24 Mild OAD  improvement at FVC  POCT 7/12/2024 Rapid COVID-positive  Chest x-ray PA lateral April 25, 2024 Lung fields are consistent with the known 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  lung volumes nl range  DLCO 71 % WNL  HGB 13.2 stable pulmonary physiology  GITA 6/26/23  Very Mild OAD  No BD at FEV1  no decline at flows  PFT 3/20/23 Mild OAD Lung Volumes nl  No   airtrapping  DLCo 88 % HGB 11.1  Chest x-ray PA lateral January 30, 2023 Normal cardiac size Chronic left upper lobe density consistent with known fungal mycetoma No parenchymal infiltrates pleural effusions or dominant pulmonary nodules Lamination right hemidiaphragm impression chronic noted interval change of the left upper lobe mycetoma secondary to sarcoidosis  PFT 1/30/23 Flow rates nl Mild OAD Lung Volumes nl' DLCO 73 % low nlk range HGB 11.5 Mild OAD with mild decline at DLCO and TLC  Sleep Study Nov 17 /19 2022 AHI 4 *8 + hours of study does not meet criteria for JAVIER  PFT 11/17/22  Mild OAD Lung Volumes nl  DLCO 89 % pred WNL HGB 11.5 stable pulmonary physiology  Spirometry no bronchodilator September 29, 2022 Flow rates normal range Stable FEV1 Sawtooth pattern clinical correlation rule out obstructive sleep apnea  PFT  6/23/22  Mild OAD  Lung Volumes nl range ERV dec sec pt weight DLCO 82 % WNL HGB 12.3 stable pulmonary physiology  PFT 3/10/22 mild OAD lung volumes DLCO 80 n% HGB 11.0  X-ray PA lateral March 10, 2022 Cardiac size normal Lobulated density left upper lung zone Right lungs are clear Impression longstanding mycetoma from underlying sarcoidosis no interval change compared to prior x-rays continue to monitor  PFT 12/2/21 Flow rates nl  Mild OAD Lung Volumes nl DLCO 85 %  HGB 11.3 very minimal decline at pulmonary physiology  PFT 9/2/21 Mild OAD  Lung Volumes nl  DLCO 86 % pred nl range  HGB 11.3 stable to interval improvement pulmonary physiology  PFT 6/3/21 Nl flow  rates  mild OASD  Lung Volumes nl  DLCO 87 %  HGB 10.5  Chest x-ray PA lateral on January 4, 2021 indication leukocytosis Chronic mycetoma left upper lung zone No change Otherwise lungs are clear without parenchymal infiltrates pleural effusions or dominant pulmonary nodules Lamination right hemidiaphragm Impression pulmonary lesion consistent with known mycetoma left upper lung zone in a patient with sarcoidosis  PFT October 8, 2020 protocol Mild obstructive ventilatory impairment No significant response to bronchodilator at the FEV1 Lung volumes are normal Resistance and specific conductance are normal Diffusion normal 80% of predicted. Stable pulmonary physiology Consistent with known diagnosis of asthma  11/26/2019 Pulmonary function testing FEV1, FVC, and FEV1/FVC are within normal limits.   Flu vaccination IM 10/10/24 PCV 23 IM October 5, 2020

## 2025-06-13 NOTE — HISTORY OF PRESENT ILLNESS
[Never] : never [Snoring] : snoring [TextBox_4] :  hx as noted  chronic  asthma sarcoid  mycetoma to complete Diflucan for prior  noted thrush no Resp complications post colonoscopy  using trelegy daily and albuterol PRN  montelukast daily and allegra PRN  no sputum and no fevers   1/10/25 pt here for f/u COVID back in July, feeling well, seasonal allergies as of right now  with frigid weather little chest  congestion  no sputum  no Heme  complaint with controller tx  Chief complaint sinus pain sinus fullness headache Resolved Cough Drip Does not feel chest congestion wheeze Did not report wheeze No COVID rapid antigen test completed  Asthma on long-term chronic controller therapy with intermittent steroid requirement  TRELEGY stable without inc Resp sxs sarcoidosis  Fungal mycetoma KRYSTLE c/o sinus disease pos PNDS poor sleep Rehem TMJ -Dr Nicole Pred-at present off f/u pos  cough  Noted in boot L Lower  ext  foot fx  ENT CT sinus possible surgical intervention  Recurrent repeat COVID-19 infection confirmed on 11/24/2022 Patient completed a course of oral Paxlovid Patient treated early December with Augmentin for a recurrent sinus infection F/U sleep  study Sleep Study Nov 17 /19 2022 AHI 4 *8 + hours of study does not meet criteria for OS  prefers Tudorza but issue with coverage Treatment first week of December for sinus congestion headache with Flonase antibiotic resolution of symptoms- mild chronic  Noted with Rheum elevated WBC Post hospitalization discharge from [facility] March  reports July 2020 COVID AB negative feels better with LAMA Turdoza Patient was having a week of some chest discomfort with shortness of breath and chest tightness No purulent sputum or hemoptysis reported Had some associated sinus symptoms with congestion and nasal drip No fever reported She was subsequently admitted put in isolation for possible presumed TB based on her chest x-ray and CT chest Findings were consistent with a prior known left upper lobe mass consistent with a well-known longstanding aspergilloma's, 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 3-day taper In addition she has no body aches or pains weight is stable appetite is normal no other known constitutional symptoms She has no loss of taste or smell

## 2025-06-13 NOTE — PROCEDURE
[FreeTextEntry1] : GITA 6/13/25  Well preserved flow  rates  Mild OAD stable  NOIOX 18  WNL  5/9/25 End tidal CO2 35 no hypercarbia  5/9/25  GITA 5/9/25  well preserved flow  rates  Mild OAD  borderline BD at FEV1 10 %  PFT April 11, 2025 indication asthma, pulmonary sarcoidosis Moderate obstructive ventilatory impairment Significant bronchodilator sponsor 27% at FEV1 Lung volumes normal No restrictive component Total lung capacity 85% of predicted No air trapping Diffusion normal range 80% of predicted NIOX 18 normal range April 11, 2025  GITA 3/6/25  mild OAD  no BD at FEV1   NIOX 10 normal range GHI patient February 6, 2025 Spirometry February 6, 2025 flow  rates well-preserved Mild obstructive pattern No bronchodilator response in FEV1 or small airways Interval improvement in FEV1 from 1.52-1.92  Chest x-ray PA lateral February 6, 2025 Cardiac size is normal Left upper lung zone density consistent with known mycetoma in a patient with known sarcoidosis longstanding abnormal finding Right lung is otherwise clear No parenchymal pleural effusion dominant pulmonary nodules No evidence for infection No consolidation Lamination right hemidiaphragm No interval change compared to chest x-ray April 25, 2020  GITA 1/10/25  Moderate OAD Pos BD 21 %  PFT 10/10/24 Mild  OAD  Lung Volumes WNL DLCO 84 % HGB 11.6  GITA 7/25/24 Mild OAD  improvement at FVC  POCT 7/12/2024 Rapid COVID-positive  Chest x-ray PA lateral April 25, 2024 Lung fields are consistent with the known 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  lung volumes nl range  DLCO 71 % WNL  HGB 13.2 stable pulmonary physiology  GITA 6/26/23  Very Mild OAD  No BD at FEV1  no decline at flows  PFT 3/20/23 Mild OAD Lung Volumes nl  No   airtrapping  DLCo 88 % HGB 11.1  Chest x-ray PA lateral January 30, 2023 Normal cardiac size Chronic left upper lobe density consistent with known fungal mycetoma No parenchymal infiltrates pleural effusions or dominant pulmonary nodules Lamination right hemidiaphragm impression chronic noted interval change of the left upper lobe mycetoma secondary to sarcoidosis  PFT 1/30/23 Flow rates nl Mild OAD Lung Volumes nl' DLCO 73 % low nlk range HGB 11.5 Mild OAD with mild decline at DLCO and TLC  Sleep Study Nov 17 /19 2022 AHI 4 *8 + hours of study does not meet criteria for JAVIER  PFT 11/17/22  Mild OAD Lung Volumes nl  DLCO 89 % pred WNL HGB 11.5 stable pulmonary physiology  Spirometry no bronchodilator September 29, 2022 Flow rates normal range Stable FEV1 Sawtooth pattern clinical correlation rule out obstructive sleep apnea  PFT  6/23/22  Mild OAD  Lung Volumes nl range ERV dec sec pt weight DLCO 82 % WNL HGB 12.3 stable pulmonary physiology  PFT 3/10/22 mild OAD lung volumes DLCO 80 n% HGB 11.0  X-ray PA lateral March 10, 2022 Cardiac size normal Lobulated density left upper lung zone Right lungs are clear Impression longstanding mycetoma from underlying sarcoidosis no interval change compared to prior x-rays continue to monitor  PFT 12/2/21 Flow rates nl  Mild OAD Lung Volumes nl DLCO 85 %  HGB 11.3 very minimal decline at pulmonary physiology  PFT 9/2/21 Mild OAD  Lung Volumes nl  DLCO 86 % pred nl range  HGB 11.3 stable to interval improvement pulmonary physiology  PFT 6/3/21 Nl flow  rates  mild OASD  Lung Volumes nl  DLCO 87 %  HGB 10.5  Chest x-ray PA lateral on January 4, 2021 indication leukocytosis Chronic mycetoma left upper lung zone No change Otherwise lungs are clear without parenchymal infiltrates pleural effusions or dominant pulmonary nodules Lamination right hemidiaphragm Impression pulmonary lesion consistent with known mycetoma left upper lung zone in a patient with sarcoidosis  PFT October 8, 2020 protocol Mild obstructive ventilatory impairment No significant response to bronchodilator at the FEV1 Lung volumes are normal Resistance and specific conductance are normal Diffusion normal 80% of predicted. Stable pulmonary physiology Consistent with known diagnosis of asthma  11/26/2019 Pulmonary function testing FEV1, FVC, and FEV1/FVC are within normal limits.   Flu vaccination IM 10/10/24 PCV 23 IM October 5, 2020

## 2025-06-13 NOTE — ASSESSMENT
[FreeTextEntry1] : Patient is scheduled for gastric colonoscopy completed without complications  Pulmonary diagnosis Chronic asthma Pulmonary sarcoidosis Mycetoma History rheumatoid arthritis Based on evaluation there would not be any contraindications to undergoing scheduled GI colonoscopy Patient is medically and pulmonary cleared   Continue controller therapy

## 2025-06-13 NOTE — DISCUSSION/SUMMARY
[FreeTextEntry1] : Chronic asthma chest congestion stable flow  rates Reactive component maintain controller therapy notify if any decline in overall respiratory status Rule out trigger of stress  Follow-up   8  weeks Will notify if any deterioration Remains on controller therapy up to date  positive active COVID-19 infection-treatment Paxlovid July 12 2024  Posttreatment sinusitis with Augmentin with chronic sxs and cough-Not  active Post COVID-19 infection 11/24/2022 Moderate persistent asthma cont TRELEY Sarcoidosis Left upper lobe chronic mycetoma/pulmonary  etiology sarcoidosis Rheumatoid arthritis history of arthritis on longstanding methotrexate Intermittent chronic vertigo Obstructive pattern spirometry high clinical suspicion rule out obstructive sleep apnea-formal sleep study ordered home Negative Medications reviewed Vaccination profile up to date BREO Ellipta  Tudorza 1 puff BID SUPPLIED sample TRELEGY  ENT recommendation Dr Little or  partners- nasal sprays for PND f/u PFT Q 3 months  Received MODERNA  completed and recommended booster 3 moths from prior COVID Nov 2022 infection valium flight phobia I stop check COVID variant booster  completed issue Biologic agent-investigate authorization for NUCALA Indications moderate to severe persistent asthma symptomatology recurrent use of antibiotics systemic corticosteroids long-term controller therapy wheezing elevated eosinophils Rx updated